# Patient Record
Sex: FEMALE | NOT HISPANIC OR LATINO | Employment: FULL TIME | URBAN - METROPOLITAN AREA
[De-identification: names, ages, dates, MRNs, and addresses within clinical notes are randomized per-mention and may not be internally consistent; named-entity substitution may affect disease eponyms.]

---

## 2018-05-21 ENCOUNTER — TELEPHONE (OUTPATIENT)
Dept: RETAIL CLINIC | Facility: CLINIC | Age: 38
End: 2018-05-21

## 2018-05-21 ENCOUNTER — OFFICE VISIT (OUTPATIENT)
Dept: RETAIL CLINIC | Facility: CLINIC | Age: 38
End: 2018-05-21

## 2018-05-21 VITALS
BODY MASS INDEX: 47.26 KG/M2 | WEIGHT: 256.8 LBS | HEIGHT: 62 IN | TEMPERATURE: 97.3 F | OXYGEN SATURATION: 97 % | RESPIRATION RATE: 18 BRPM | HEART RATE: 70 BPM

## 2018-05-21 DIAGNOSIS — H00.014 HORDEOLUM EXTERNUM OF LEFT UPPER EYELID: Primary | ICD-10-CM

## 2018-05-21 DIAGNOSIS — L71.9 ROSACEA: ICD-10-CM

## 2018-05-21 DIAGNOSIS — J45.20 MILD INTERMITTENT ASTHMA WITHOUT COMPLICATION: ICD-10-CM

## 2018-05-21 DIAGNOSIS — R11.0 NAUSEA: ICD-10-CM

## 2018-05-21 PROCEDURE — 99214 OFFICE O/P EST MOD 30 MIN: CPT | Performed by: NURSE PRACTITIONER

## 2018-05-21 RX ORDER — ERYTHROMYCIN 5 MG/G
OINTMENT OPHTHALMIC NIGHTLY
Qty: 1 G | Refills: 0 | Status: SHIPPED | OUTPATIENT
Start: 2018-05-21 | End: 2018-05-21

## 2018-05-21 RX ORDER — METRONIDAZOLE 10 MG/G
GEL TOPICAL DAILY
Qty: 60 G | Refills: 0 | Status: SHIPPED | OUTPATIENT
Start: 2018-05-21 | End: 2018-06-20

## 2018-05-21 RX ORDER — DOXYCYCLINE 50 MG/1
100 CAPSULE ORAL 2 TIMES DAILY
COMMUNITY
End: 2019-11-08

## 2018-05-21 RX ORDER — ONDANSETRON 4 MG/1
4 TABLET, ORALLY DISINTEGRATING ORAL EVERY 8 HOURS PRN
Qty: 9 TABLET | Refills: 0 | Status: SHIPPED | OUTPATIENT
Start: 2018-05-21 | End: 2018-05-24

## 2018-05-21 NOTE — TELEPHONE ENCOUNTER
Patient called clinic, stating insurance will not pay for erythromycin ointment.  Called phamarcy - erythromycin is covered.  Changed metronidazole gel to .75%, covered by insurance.

## 2018-05-21 NOTE — PATIENT INSTRUCTIONS
Nausea, Adult  Feeling sick to your stomach (nausea) means that your stomach is upset or you feel like you have to throw up (vomit). Feeling sick to your stomach is usually not serious, but it may be an early sign of a more serious medical problem. As you feel sicker to your stomach, it can lead to throwing up (vomiting). If you throw up, or if you are not able to drink enough fluids, there is a risk of dehydration. Dehydration can make you feel tired and thirsty, have a dry mouth, and pee (urinate) less often. Older adults and people who have other diseases or a weak defense (immune) system have a higher risk of dehydration.  The main goal of treating this condition is to:  · Limit how often you feel sick to your stomach.  · Prevent throwing up and dehydration.  Follow these instructions at home:  Follow instructions from your doctor about how to care for yourself at home.  Eating and drinking   Follow these recommendations as told by your doctor:  · Take an oral rehydration solution (ORS). This is a drink that is sold at pharmacies and stores.  · Drink clear fluids in small amounts as you are able, such as:  ¨ Water.  ¨ Ice chips.  ¨ Fruit juice that has water added (diluted fruit juice).  ¨ Low-calorie sports drinks.  · Eat bland, easy to digest foods in small amounts as you are able, such as:  ¨ Bananas.  ¨ Applesauce.  ¨ Rice.  ¨ Lean meats.  ¨ Toast.  ¨ Crackers.  · Avoid drinking fluids that contain a lot of sugar or caffeine.  · Avoid alcohol.  · Avoid spicy or fatty foods.  General instructions   · Drink enough fluid to keep your pee (urine) clear or pale yellow.  · Wash your hands often. If you cannot use soap and water, use hand .  · Make sure that all people in your household wash their hands well and often.  · Rest at home while you get better.  · Take over-the-counter and prescription medicines only as told by your doctor.  · Breathe slowly and deeply when you feel sick to your  stomach.  · Watch your condition for any changes.  · Keep all follow-up visits as told by your doctor. This is important.  Contact a doctor if:  · You have a headache.  · You have new symptoms.  · You feel sicker to your stomach.  · You have a fever.  · You feel light-headed or dizzy.  · You throw up.  · You are not able to keep fluids down.  Get help right away if:  · You have pain in your chest, neck, arm, or jaw.  · You feel very weak or you pass out (faint).  · You have throw up that is bright red or looks like coffee grounds.  · You have bloody or black poop (stools), or poop that looks like tar.  · You have a very bad headache, a stiff neck, or both.  · You have very bad pain, cramping, or bloating in your belly.  · You have a rash.  · You have trouble breathing or you are breathing very quickly.  · Your heart is beating very quickly.  · Your skin feels cold and clammy.  · You feel confused.  · You have pain while peeing.  · You have signs of dehydration, such as:  ¨ Dark pee, or very little or no pee.  ¨ Cracked lips.  ¨ Dry mouth.  ¨ Sunken eyes.  ¨ Sleepiness.  ¨ Weakness.  These symptoms may be an emergency. Do not wait to see if the symptoms will go away. Get medical help right away. Call your local emergency services (911 in the [place] Do not drive yourself to the hospital.  This information is not intended to replace advice given to you by your health care provider. Make sure you discuss any questions you have with your health care provider.  Document Released: 12/06/2012 Document Revised: 05/25/2017 Document Reviewed: 08/23/2016  [employer] Interactive Patient Education © 2017 [employer]  Rosacea  Rosacea is a long-term (chronic) condition that affects the skin of the face, including the cheeks, nose, brow, and chin. This condition can also affect the eyes. Rosacea causes blood vessels near the surface of the skin to get bigger (be enlarged), and that makes the skin red. There is no cure for this  condition, but treatment can help to control your symptoms.  Follow these instructions at home:  Skin Care   Take care of your skin as told by your doctor. Your doctor may tell you do these things:  · Wash your skin gently two or more times each day.  · Use mild soap.  · Use a sunscreen or sunblock with SPF 30 or greater.  · Use gentle cosmetics that are meant for sensitive skin.  · Shave with an electric shaver instead of a blade.  Lifestyle   · Try to keep track of what foods trigger this condition. Avoid any triggers. These may include:  ¨ Spicy foods.  ¨ Seafood.  ¨ Cheese.  ¨ Hot liquids.  ¨ Nuts.  ¨ Chocolate.  ¨ Iodized salt.  · Do not drink alcohol.  · Avoid extremely cold or hot temperatures.  · Try to reduce your stress. If you need help to do this, talk with your doctor.  · When you exercise, do these things to stay cool:  ¨ Limit your sun exposure.  ¨ Use a fan.  ¨ Exercise for a shorter time, and exercise more often.  General instructions   · Keep all follow-up visits as told by your doctor. This is important.  · Take over-the-counter and prescription medicines only as told by your doctor.  · If your eyelids are affected, press warm compresses to them. Do this as told by your doctor.  · If you were prescribed an antibiotic medicine, apply or take it as told by your doctor. Do not stop using the antibiotic even if your condition improves.  Contact a doctor if:  · Your symptoms get worse.  · Your symptoms do not improve after two months of treatment.  · You have new symptoms.  · You have any changes in vision or you have problems with your eyes, such as redness or itching.  · You feel very sad (depressed).  · You lose your appetite.  · You have trouble concentrating.  This information is not intended to replace advice given to you by your health care provider. Make sure you discuss any questions you have with your health care provider.  Document Released: 03/11/2013 Document Revised: 05/25/2017 Document  Reviewed: 02/24/2016  [employer] Interactive Patient Education © 2017 [employer]  Stye  A stye is a bump on your eyelid caused by a bacterial infection. A stye can form inside the eyelid (internal stye) or outside the eyelid (external stye). An internal stye may be caused by an infected oil-producing gland inside your eyelid. An external stye may be caused by an infection at the base of your eyelash (hair follicle).  Styes are very common. Anyone can get them at any age. They usually occur in just one eye, but you may have more than one in either eye.  What are the causes?  The infection is almost always caused by bacteria called Staphylococcus aureus. This is a common type of bacteria that lives on your skin.  What increases the risk?  You may be at higher risk for a stye if you have had one before. You may also be at higher risk if you have:  · Diabetes.  · Long-term illness.  · Long-term eye redness.  · A skin condition called seborrhea.  · High fat levels in your blood (lipids).  What are the signs or symptoms?  Eyelid pain is the most common symptom of a stye. Internal styes are more painful than external styes. Other signs and symptoms may include:  · Painful swelling of your eyelid.  · A scratchy feeling in your eye.  · Tearing and redness of your eye.  · Pus draining from the stye.  How is this diagnosed?  Your health care provider may be able to diagnose a stye just by examining your eye. The health care provider may also check to make sure:  · You do not have a fever or other signs of a more serious infection.  · The infection has not spread to other parts of your eye or areas around your eye.  How is this treated?  Most styes will clear up in a few days without treatment. In some cases, you may need to use antibiotic drops or ointment to prevent infection. Your health care provider may have to drain the stye surgically if your stye is:  · Large.  · Causing a lot of pain.  · Interfering with your  vision.  This can be done using a thin blade or a needle.  Follow these instructions at home:  · Take medicines only as directed by your health care provider.  · Apply a clean, warm compress to your eye for 10 minutes, 4 times a day.  · Do not wear contact lenses or eye makeup until your stye has healed.  · Do not try to pop or drain the stye.  Contact a health care provider if:  · You have chills or a fever.  · Your stye does not go away after several days.  · Your stye affects your vision.  · Your eyeball becomes swollen, red, or painful.  This information is not intended to replace advice given to you by your health care provider. Make sure you discuss any questions you have with your health care provider.  Document Released: 09/27/2006 Document Revised: 08/13/2017 Document Reviewed: 04/03/2015  [employer] Interactive Patient Education © 2017 [employer]

## 2018-05-21 NOTE — PROGRESS NOTES
Subjective   Kacie Mario is a 37 y.o. female    CHIEF COMPLAINT  Conjunctivitis      Kacie states she has eye infections fairly commonly - has changed her sheets, wiped down her house.  Has had cats x 2 years without eye irritation (states she will not get rid of her cats).  Her rosacea has been worsening, lately & she recently started taking doxycycline mono 100 mg daily.  She plans to make appointment with dermatology when she gets back to the  - headed to [facility], today.        Conjunctivitis    The current episode started 5 to 7 days ago. The onset was sudden. The problem has been unchanged. Nothing relieves the symptoms. Exacerbated by: sleeping. Associated symptoms include double vision, eye itching, photophobia, nausea, rash, eye pain and eye redness. Pertinent negatives include no fever, no decreased vision, no abdominal pain, no diarrhea, no vomiting, no congestion, no ear discharge, no ear pain, no headaches, no hearing loss, no mouth sores, no rhinorrhea, no sore throat, no stridor, no swollen glands, no URI and no eye discharge.       The following portions of the patient's history were reviewed and updated as appropriate: allergies, current mediations, past family history, past medical history, past social history, past surgical history, and problem list.    Review of Systems   Constitutional: Negative.  Negative for fever.   HENT: Negative for congestion, ear discharge, ear pain, hearing loss, mouth sores, rhinorrhea and sore throat.    Eyes: Positive for double vision, photophobia, pain, redness and itching. Negative for discharge.   Respiratory: Negative for stridor.    Cardiovascular: Negative.    Gastrointestinal: Positive for nausea. Negative for abdominal pain, diarrhea and vomiting.   Skin: Positive for rash.   Neurological: Negative for dizziness, light-headedness and headaches.         Objective   No Known Allergies      Pulse 70   Temp 97.3 °F (36.3 °C) (Temporal Artery )    "Resp 18   Ht 156.8 cm (61.75\")   Wt 116 kg (256 lb 12.8 oz)   SpO2 97%   BMI 47.35 kg/m²     Physical Exam   Constitutional: She is oriented to person, place, and time. She appears well-developed and well-nourished.   HENT:   Head: Normocephalic.   Right Ear: External ear normal.   Left Ear: External ear normal.   Nose: Nose normal.   Mouth/Throat: Uvula is midline, oropharynx is clear and moist and mucous membranes are normal.   Eyes: Conjunctivae and EOM are normal. Pupils are equal, round, and reactive to light.       Cardiovascular: Normal rate and regular rhythm.    Pulmonary/Chest: Effort normal. She has wheezes (expiratory) in the right lower field and the left lower field.   Abdominal: Soft. Bowel sounds are normal. She exhibits no distension. There is no tenderness. There is no rebound.   Neurological: She is alert and oriented to person, place, and time.   Skin:   Erythema, papules, telangiectasia on bilateral cheeks, forehead, chin and nose       Assessment/Plan   Kacie was seen today for conjunctivitis.    Diagnoses and all orders for this visit:    Hordeolum externum of left upper eyelid  -     erythromycin (ROMYCIN) 5 MG/GM ophthalmic ointment; Administer  into the left eye Every Night for 5 days.  -  warm compress to affected eye, wash compress after single use in hot water  - Wash hands before and after touching eye(s)  - Discard used eye makeup  - Advised to discuss possibility of ocular rosacea with PCP/dermatology    Rosacea  -     metroNIDAZOLE (METROGEL) 1 % gel; Apply  topically Daily for 30 days.  - Avoid triggers - stress, caffeine, spicy foods, alcohol  - Use mild cleansers, moisturizers    Nausea  -     ondansetron ODT (ZOFRAN ODT) 4 MG disintegrating tablet; Take 1 tablet by mouth Every 8 (Eight) Hours As Needed for Nausea or Vomiting for up to 3 days.    Mild intermittent asthma without complication  -     beclomethasone (QVAR) 80 MCG/ACT inhaler; Inhale 2 puffs 2 (Two) Times a " Day.  - Follow up with PCP upon arrival to [place]     An After Visit Summary was printed, reviewed, and given to the patient. Understanding verbalized and agrees with treatment plan.  If no improvement or becomes worse, follow up with primary or go to [facility]/ER.        May 21, 2018  10:56 AM

## 2019-09-16 DIAGNOSIS — L71.9 ROSACEA: ICD-10-CM

## 2019-09-16 RX ORDER — METRONIDAZOLE 7.5 MG/G
GEL TOPICAL
Refills: 0 | OUTPATIENT
Start: 2019-09-16

## 2019-10-09 DIAGNOSIS — L71.9 ROSACEA: ICD-10-CM

## 2019-10-09 RX ORDER — METRONIDAZOLE 7.5 MG/G
GEL TOPICAL
Refills: 0 | OUTPATIENT
Start: 2019-10-09

## 2019-11-08 ENCOUNTER — OFFICE VISIT (OUTPATIENT)
Dept: RETAIL CLINIC | Facility: CLINIC | Age: 39
End: 2019-11-08

## 2019-11-08 VITALS
SYSTOLIC BLOOD PRESSURE: 130 MMHG | DIASTOLIC BLOOD PRESSURE: 88 MMHG | HEART RATE: 77 BPM | RESPIRATION RATE: 18 BRPM | WEIGHT: 262 LBS | TEMPERATURE: 98.2 F | BODY MASS INDEX: 49.47 KG/M2 | HEIGHT: 61 IN | OXYGEN SATURATION: 97 %

## 2019-11-08 DIAGNOSIS — H00.011 HORDEOLUM EXTERNUM OF RIGHT UPPER EYELID: Primary | ICD-10-CM

## 2019-11-08 PROCEDURE — 99213 OFFICE O/P EST LOW 20 MIN: CPT | Performed by: NURSE PRACTITIONER

## 2019-11-08 RX ORDER — BACITRACIN ZINC AND POLYMYXIN B SULFATE 500; 10000 [USP'U]/G; [USP'U]/G
OINTMENT OPHTHALMIC EVERY 12 HOURS
Qty: 3.5 G | Refills: 0 | Status: SHIPPED | OUTPATIENT
Start: 2019-11-08 | End: 2019-11-13

## 2019-11-08 NOTE — PROGRESS NOTES
Eye Pain      Subjective   Kacie Mario is a 38 y.o. female.     Eye Pain    The right eye is affected. This is a new problem. The current episode started yesterday. The problem occurs constantly. The problem has been unchanged. There was no injury mechanism. The pain is at a severity of 5/10. There is no known exposure to pink eye. She does not wear contacts. Associated symptoms include an eye discharge (watery), eye redness and itching. Pertinent negatives include no blurred vision, double vision, fever, foreign body sensation, nausea, photophobia, recent URI or vomiting. Treatments tried: ibuprofen and antihistamine. The treatment provided mild relief.      Patient reports she woke this morning with right upper eyelid swelling that is painful to touch, itching, and watery eye drainage. She states she took ibuprofen that gave her some relief.    There is no problem list on file for this patient.      No Known Allergies     Current Outpatient Medications on File Prior to Visit   Medication Sig Dispense Refill   • Albuterol Sulfate (VENTOLIN HFA IN) Inhale.     • beclomethasone (QVAR) 80 MCG/ACT inhaler Inhale 2 puffs 2 (Two) Times a Day. 1 inhaler 0   • [DISCONTINUED] doxycycline (MONODOX) 50 MG capsule Take 100 mg by mouth 2 (Two) Times a Day.     • [DISCONTINUED] Pseudoeph-Doxylamine-DM-APAP (NYQUIL PO) Take  by mouth.     • [DISCONTINUED] Pseudoephedrine-APAP-DM (DAYQUIL PO) Take  by mouth.       No current facility-administered medications on file prior to visit.        Past Medical History:   Diagnosis Date   • Asthma    • Rosacea        Past Surgical History:   Procedure Laterality Date   • TONSILLECTOMY         Family History   Problem Relation Age of Onset   • Liver disease Mother    • Kidney disease Father        Social History     Socioeconomic History   • Marital status: Single     Spouse name: Not on file   • Number of children: Not on file   • Years of education: Not on file   • Highest education  "level: Not on file   Tobacco Use   • Smoking status: Never Smoker   • Smokeless tobacco: Never Used   Substance and Sexual Activity   • Alcohol use: Yes     Comment: OCCASIONALLY   • Drug use: No       Travel:  No recent travel within the last 21 days outside the [place] Denies recent travel to one of the following [place]ries:  [place], [place], [place], or [place].  Denies contact with anyone who has traveled to one of the following [place]  Countries: [place], [place], [place], or [place] within the last 21 days and is known or suspected to have Ebola.  Denies having had any contact with the human remains, blood or any bodily fluids of someone who is known or suspected to have Ebola within the last 21 days.     OB History     No data available          Review of Systems   Constitutional: Negative for chills, fatigue and fever.   HENT: Negative for congestion, ear discharge, ear pain, postnasal drip, rhinorrhea, sinus pain and sore throat.    Eyes: Positive for pain, discharge (watery), redness and itching. Negative for blurred vision, double vision, photophobia and visual disturbance.   Gastrointestinal: Negative for diarrhea, nausea and vomiting.   Skin: Negative for rash.   Neurological: Negative for dizziness and headaches.   All other systems reviewed and are negative.      /88 (BP Location: Left arm, Patient Position: Sitting, Cuff Size: Adult)   Pulse 77   Temp 98.2 °F (36.8 °C) (Oral)   Resp 18   Ht 154.9 cm (61\")   Wt 119 kg (262 lb)   LMP 10/25/2019   SpO2 97%   Breastfeeding? No   BMI 49.50 kg/m²     Objective   Physical Exam   Constitutional: She is oriented to person, place, and time. She appears well-developed and well-nourished. No distress.   HENT:   Head: Normocephalic and atraumatic.   Right Ear: Hearing, tympanic membrane, external ear and ear canal normal.   Left Ear: Hearing, tympanic membrane, external ear and ear canal normal.   Nose: Nose normal. No mucosal " edema or rhinorrhea.   Mouth/Throat: Uvula is midline, oropharynx is clear and moist and mucous membranes are normal.   Eyes: Conjunctivae and EOM are normal. Pupils are equal, round, and reactive to light. Lids are everted and swept, no foreign bodies found. Right eye exhibits discharge (watery) and hordeolum. Left eye exhibits no discharge. Right conjunctiva is not injected. Right conjunctiva has no hemorrhage. Left conjunctiva is not injected. Left conjunctiva has no hemorrhage. No scleral icterus. Right eye exhibits normal extraocular motion. Left eye exhibits normal extraocular motion.       Right upper eyelid with erythema and edema, tenderness to touch; denies any visual disturbances   Neck: Normal range of motion. Neck supple.   Cardiovascular: Normal rate, regular rhythm and normal heart sounds.   Pulmonary/Chest: Effort normal and breath sounds normal. She has no wheezes. She has no rales.   Musculoskeletal: Normal range of motion.   Lymphadenopathy:     She has no cervical adenopathy.   Neurological: She is alert and oriented to person, place, and time.   Skin: Skin is warm and dry. No rash noted. She is not diaphoretic.   Psychiatric: She has a normal mood and affect. Her behavior is normal.   Vitals reviewed.      Assessment/Plan   Kacie was seen today for eye pain.    Diagnoses and all orders for this visit:    Hordeolum externum of right upper eyelid  -     bacitracin-polymyxin b (POLYSPORIN) 500-07897 UNIT/GM ophthalmic ointment; Administer  to the right eye Every 12 (Twelve) Hours for 5 days.    Plan of care discussed; apply warm moist compresses; alternate Tylenol and Motrin for pain; avoid scratching or rubbing the eye; use medication as directed; follow up with PCP/opthalmology for no improvement or go to ER for new or worsening symptoms. Patient verbalized understanding.     No results found for this or any previous visit.    No Follow-up on file.

## 2019-11-20 ENCOUNTER — OFFICE VISIT (OUTPATIENT)
Dept: RETAIL CLINIC | Facility: CLINIC | Age: 39
End: 2019-11-20

## 2019-11-20 VITALS
RESPIRATION RATE: 20 BRPM | WEIGHT: 257 LBS | SYSTOLIC BLOOD PRESSURE: 128 MMHG | HEIGHT: 62 IN | OXYGEN SATURATION: 99 % | BODY MASS INDEX: 47.29 KG/M2 | HEART RATE: 76 BPM | TEMPERATURE: 98.6 F | DIASTOLIC BLOOD PRESSURE: 86 MMHG

## 2019-11-20 DIAGNOSIS — R68.89 FLU-LIKE SYMPTOMS: ICD-10-CM

## 2019-11-20 DIAGNOSIS — J02.9 SORE THROAT: Primary | ICD-10-CM

## 2019-11-20 LAB
EXPIRATION DATE: ABNORMAL
EXPIRATION DATE: NORMAL
FLUAV AG NPH QL: NEGATIVE
FLUBV AG NPH QL: NEGATIVE
INTERNAL CONTROL: ABNORMAL
INTERNAL CONTROL: NORMAL
Lab: ABNORMAL
Lab: NORMAL
S PYO AG THROAT QL: NEGATIVE

## 2019-11-20 PROCEDURE — 99213 OFFICE O/P EST LOW 20 MIN: CPT | Performed by: NURSE PRACTITIONER

## 2019-11-20 PROCEDURE — 87880 STREP A ASSAY W/OPTIC: CPT | Performed by: NURSE PRACTITIONER

## 2019-11-20 PROCEDURE — 87804 INFLUENZA ASSAY W/OPTIC: CPT | Performed by: NURSE PRACTITIONER

## 2019-11-20 RX ORDER — PSEUDOEPHEDRINE HCL 120 MG/1
120 TABLET, FILM COATED, EXTENDED RELEASE ORAL EVERY 12 HOURS
Qty: 20 TABLET | Refills: 0 | Status: SHIPPED | OUTPATIENT
Start: 2019-11-20 | End: 2019-11-30

## 2019-11-20 RX ORDER — BENZONATATE 200 MG/1
200 CAPSULE ORAL 3 TIMES DAILY PRN
Qty: 30 CAPSULE | Refills: 0 | Status: SHIPPED | OUTPATIENT
Start: 2019-11-20 | End: 2020-07-29

## 2019-11-20 NOTE — PATIENT INSTRUCTIONS
Pharyngitis    Pharyngitis is redness, pain, and swelling (inflammation) of the throat (pharynx). It is a very common cause of sore throat. Pharyngitis can be caused by a bacteria, but it is usually caused by a virus. Most cases of pharyngitis get better on their own without treatment.  What are the causes?  This condition may be caused by:  · Infection by viruses (viral). Viral pharyngitis spreads from person to person (is contagious) through coughing, sneezing, and sharing of personal items or utensils such as cups, forks, spoons, and toothbrushes.  · Infection by bacteria (bacterial). Bacterial pharyngitis may be spread by touching the nose or face after coming in contact with the bacteria, or through more intimate contact, such as kissing.  · Allergies. Allergies can cause buildup of mucus in the throat (post-nasal drip), leading to inflammation and irritation. Allergies can also cause blocked nasal passages, forcing breathing through the mouth, which dries and irritates the throat.  What increases the risk?  You are more likely to develop this condition if:  · You are 5-24 years old.  · You are exposed to crowded environments such as , school, or dormitory living.  · You live in a cold climate.  · You have a weakened disease-fighting (immune) system.  What are the signs or symptoms?  Symptoms of this condition vary by the cause (viral, bacterial, or allergies) and can include:  · Sore throat.  · Fatigue.  · Low-grade fever.  · Headache.  · Joint pain and muscle aches.  · Skin rashes.  · Swollen glands in the throat (lymph nodes).  · Plaque-like film on the throat or tonsils. This is often a symptom of bacterial pharyngitis.  · Vomiting.  · Stuffy nose (nasal congestion).  · Cough.  · Red, itchy eyes (conjunctivitis).  · Loss of appetite.  How is this diagnosed?  This condition is often diagnosed based on your medical history and a physical exam. Your health care provider will ask you questions about your  illness and your symptoms. A swab of your throat may be done to check for bacteria (rapid strep test). Other lab tests may also be done, depending on the suspected cause, but these are rare.  How is this treated?  This condition usually gets better in 3-4 days without medicine. Bacterial pharyngitis may be treated with antibiotic medicines.  Follow these instructions at home:  · Take over-the-counter and prescription medicines only as told by your health care provider.  ? If you were prescribed an antibiotic medicine, take it as told by your health care provider. Do not stop taking the antibiotic even if you start to feel better.  ? Do not give children aspirin because of the association with Reye syndrome.  · Drink enough water and fluids to keep your urine clear or pale yellow.  · Get a lot of rest.  · Gargle with a salt-water mixture 3-4 times a day or as needed. To make a salt-water mixture, completely dissolve ½-1 tsp of salt in 1 cup of warm water.  · If your health care provider approves, you may use throat lozenges or sprays to soothe your throat.  Contact a health care provider if:  · You have large, tender lumps in your neck.  · You have a rash.  · You cough up green, yellow-brown, or bloody spit.  Get help right away if:  · Your neck becomes stiff.  · You drool or are unable to swallow liquids.  · You cannot drink or take medicines without vomiting.  · You have severe pain that does not go away, even after you take medicine.  · You have trouble breathing, and it is not caused by a stuffy nose.  · You have new pain and swelling in your joints such as the knees, ankles, wrists, or elbows.  Summary  · Pharyngitis is redness, pain, and swelling (inflammation) of the throat (pharynx).  · While pharyngitis can be caused by a bacteria, the most common causes are viral.  · Most cases of pharyngitis get better on their own without treatment.  · Bacterial pharyngitis is treated with antibiotic medicines.  This  information is not intended to replace advice given to you by your health care provider. Make sure you discuss any questions you have with your health care provider.  Document Released: 12/18/2006 Document Revised: 01/23/2018 Document Reviewed: 01/23/2018  [employer] Interactive Patient Education © 2019 [employer]    --Please utilize tylenol or ibuprofen as needed for fever or pain. Use as recommended.   -- Noted 8-10 eight ounce glasses of fluid a day. Nonalcoholic and noncafeinated beverages  -Use mucinex or equivalent over the counter medicine for congestion.

## 2019-11-20 NOTE — PROGRESS NOTES
Subjective   Kacie Mario is a 38 y.o. female.     Pt has flu like symptoms without fever that started yesterday      Influenza   This is a new problem. The current episode started in the past 7 days. The problem occurs constantly. The problem has been gradually worsening. Associated symptoms include arthralgias, chills, congestion, coughing, diaphoresis, fatigue, a fever, headaches, myalgias, nausea, a sore throat and swollen glands. Pertinent negatives include no abdominal pain, anorexia, change in bowel habit, chest pain, joint swelling, neck pain, numbness, rash, urinary symptoms, vertigo, visual change, vomiting or weakness. Nothing aggravates the symptoms. She has tried acetaminophen (dayquil this morning at 10am) for the symptoms. The treatment provided no relief.        Current Outpatient Medications on File Prior to Visit   Medication Sig Dispense Refill   • Albuterol Sulfate (VENTOLIN HFA IN) Inhale.     • beclomethasone (QVAR) 80 MCG/ACT inhaler Inhale 2 puffs 2 (Two) Times a Day. 1 inhaler 0   • levonorgestrel (MIRENA) 20 MCG/24HR IUD 1 each by Intrauterine route 1 (One) Time.       No current facility-administered medications on file prior to visit.        No Known Allergies    Past Medical History:   Diagnosis Date   • Allergic    • Asthma    • Rosacea        Past Surgical History:   Procedure Laterality Date   • TONSILLECTOMY         Family History   Problem Relation Age of Onset   • Liver disease Mother    • Kidney disease Father        Social History     Socioeconomic History   • Marital status: Single     Spouse name: Not on file   • Number of children: Not on file   • Years of education: Not on file   • Highest education level: Not on file   Tobacco Use   • Smoking status: Never Smoker   • Smokeless tobacco: Never Used   Substance and Sexual Activity   • Alcohol use: Yes     Comment: OCCASIONALLY   • Drug use: No       Review of Systems   Constitutional: Positive for appetite change, chills,  "diaphoresis, fatigue and fever.   HENT: Positive for congestion, postnasal drip, rhinorrhea, sinus pressure, sinus pain, sneezing, sore throat and voice change.    Eyes: Positive for discharge (watery), redness and itching. Negative for pain.   Respiratory: Positive for cough. Negative for chest tightness.    Cardiovascular: Negative for chest pain.   Gastrointestinal: Positive for diarrhea (x2) and nausea. Negative for abdominal pain, anorexia, change in bowel habit and vomiting.   Musculoskeletal: Positive for arthralgias and myalgias. Negative for joint swelling and neck pain.   Skin: Negative for color change and rash.   Allergic/Immunologic: Positive for environmental allergies.   Neurological: Positive for headaches. Negative for dizziness, vertigo, weakness and numbness.   Psychiatric/Behavioral: Negative for agitation.       /86   Pulse 76   Temp 98.6 °F (37 °C) (Oral)   Resp 20   Ht 156.8 cm (61.75\")   Wt 117 kg (257 lb)   LMP 10/25/2019   SpO2 99%   BMI 47.39 kg/m²     Objective   Physical Exam   Constitutional: She is oriented to person, place, and time. She appears well-developed and well-nourished. She is cooperative. She appears ill.   HENT:   Head: Normocephalic and atraumatic.   Right Ear: Tympanic membrane, external ear and ear canal normal. There is tenderness.   Left Ear: Tympanic membrane, external ear and ear canal normal. There is tenderness.   Nose: Mucosal edema and rhinorrhea present. Right sinus exhibits no maxillary sinus tenderness and no frontal sinus tenderness. Left sinus exhibits no maxillary sinus tenderness and no frontal sinus tenderness.   Mouth/Throat: Uvula is midline and mucous membranes are normal. Posterior oropharyngeal erythema present. No oropharyngeal exudate or posterior oropharyngeal edema. Tonsils are 0 on the right. Tonsils are 0 on the left. No tonsillar exudate.   Eyes: Conjunctivae and lids are normal.   Cardiovascular: Normal heart sounds. "   Pulmonary/Chest: Effort normal and breath sounds normal.   Abdominal: Soft. Bowel sounds are increased. There is no tenderness.   Lymphadenopathy:     She has cervical adenopathy.   Neurological: She is alert and oriented to person, place, and time.   Skin: Skin is warm and dry. No rash noted.   Psychiatric: She has a normal mood and affect. Her speech is normal and behavior is normal.       Procedures None    Assessment/Plan   Diagnoses and all orders for this visit:    Sore throat  -     POC Rapid Strep A    Flu-like symptoms  -     POC Influenza A / B    Other orders  -     benzonatate (TESSALON) 200 MG capsule; Take 1 capsule by mouth 3 (Three) Times a Day As Needed for Cough.  -     pseudoephedrine (SUDAFED) 120 MG 12 hr tablet; Take 1 tablet by mouth Every 12 (Twelve) Hours for 10 days.        Results for orders placed or performed in visit on 11/20/19   POC Influenza A / B   Result Value Ref Range    Rapid Influenza A Ag Negative Negative    Rapid Influenza B Ag Negative Negative    Internal Control Passed Passed    Lot Number [id number]     Expiration Date 04/30/21    POC Rapid Strep A   Result Value Ref Range    Rapid Strep A Screen Negative Negative, VALID, INVALID, Not Performed    Internal Control Passed Passed    Lot Number [id number]     Expiration Date 2/28/21        Follow up with PCP or go to the nearest emergency room if symptoms worsen or fail to improve.

## 2023-01-12 PROCEDURE — U0004 COV-19 TEST NON-CDC HGH THRU: HCPCS | Performed by: NURSE PRACTITIONER
